# Patient Record
Sex: FEMALE | Race: WHITE | NOT HISPANIC OR LATINO | Employment: FULL TIME | URBAN - METROPOLITAN AREA
[De-identification: names, ages, dates, MRNs, and addresses within clinical notes are randomized per-mention and may not be internally consistent; named-entity substitution may affect disease eponyms.]

---

## 2017-01-04 ENCOUNTER — TRANSCRIBE ORDERS (OUTPATIENT)
Dept: ADMINISTRATIVE | Facility: HOSPITAL | Age: 52
End: 2017-01-04

## 2017-01-04 DIAGNOSIS — Z12.31 VISIT FOR SCREENING MAMMOGRAM: Primary | ICD-10-CM

## 2017-01-11 ENCOUNTER — HOSPITAL ENCOUNTER (OUTPATIENT)
Dept: MAMMOGRAPHY | Facility: HOSPITAL | Age: 52
Discharge: HOME | End: 2017-01-11
Attending: OBSTETRICS & GYNECOLOGY | Admitting: OBSTETRICS & GYNECOLOGY

## 2017-01-11 DIAGNOSIS — Z12.31 VISIT FOR SCREENING MAMMOGRAM: ICD-10-CM

## 2017-01-11 PROCEDURE — G0202 SCR MAMMO BI INCL CAD: HCPCS

## 2018-01-16 ENCOUNTER — TRANSCRIBE ORDERS (OUTPATIENT)
Dept: ADMINISTRATIVE | Facility: HOSPITAL | Age: 53
End: 2018-01-16

## 2018-01-16 DIAGNOSIS — Z12.31 SCREENING MAMMOGRAM, ENCOUNTER FOR: Primary | ICD-10-CM

## 2018-02-13 ENCOUNTER — APPOINTMENT (OUTPATIENT)
Dept: MAMMOGRAPHY | Facility: HOSPITAL | Age: 53
End: 2018-02-13
Attending: OBSTETRICS & GYNECOLOGY

## 2018-02-27 ENCOUNTER — APPOINTMENT (OUTPATIENT)
Dept: MAMMOGRAPHY | Facility: HOSPITAL | Age: 53
End: 2018-02-27
Attending: OBSTETRICS & GYNECOLOGY

## 2018-03-20 ENCOUNTER — HOSPITAL ENCOUNTER (OUTPATIENT)
Dept: MAMMOGRAPHY | Facility: HOSPITAL | Age: 53
Discharge: HOME | End: 2018-03-20
Attending: OBSTETRICS & GYNECOLOGY | Admitting: OBSTETRICS & GYNECOLOGY

## 2018-03-20 DIAGNOSIS — Z12.31 SCREENING MAMMOGRAM, ENCOUNTER FOR: ICD-10-CM

## 2018-03-20 PROCEDURE — 77067 SCR MAMMO BI INCL CAD: CPT

## 2018-03-20 PROCEDURE — 77063 BREAST TOMOSYNTHESIS BI: CPT

## 2018-07-09 ENCOUNTER — TELEPHONE (OUTPATIENT)
Dept: OBSTETRICS AND GYNECOLOGY | Age: 53
End: 2018-07-09

## 2018-07-09 RX ORDER — ACYCLOVIR 400 MG/1
400 TABLET ORAL 3 TIMES DAILY
Qty: 30 TABLET | Refills: 0 | Status: SHIPPED | OUTPATIENT
Start: 2018-07-09 | End: 2020-01-29

## 2019-02-08 ENCOUNTER — TRANSCRIBE ORDERS (OUTPATIENT)
Dept: ADMINISTRATIVE | Facility: HOSPITAL | Age: 54
End: 2019-02-08

## 2019-02-08 DIAGNOSIS — Z13.21 SCREENING FOR MALNUTRITION: Primary | ICD-10-CM

## 2019-03-21 ENCOUNTER — HOSPITAL ENCOUNTER (OUTPATIENT)
Dept: MAMMOGRAPHY | Facility: HOSPITAL | Age: 54
Discharge: HOME | End: 2019-03-21
Admitting: OBSTETRICS & GYNECOLOGY

## 2019-03-21 DIAGNOSIS — Z13.21 SCREENING FOR MALNUTRITION: ICD-10-CM

## 2019-03-21 PROCEDURE — 77067 SCR MAMMO BI INCL CAD: CPT

## 2019-03-21 PROCEDURE — 77063 BREAST TOMOSYNTHESIS BI: CPT

## 2019-03-25 ENCOUNTER — OFFICE VISIT (OUTPATIENT)
Dept: OBSTETRICS AND GYNECOLOGY | Age: 54
End: 2019-03-25

## 2019-03-25 VITALS
DIASTOLIC BLOOD PRESSURE: 84 MMHG | WEIGHT: 219 LBS | SYSTOLIC BLOOD PRESSURE: 124 MMHG | BODY MASS INDEX: 34.37 KG/M2 | HEIGHT: 67 IN

## 2019-03-25 DIAGNOSIS — Z00.00 ANNUAL PHYSICAL EXAM: ICD-10-CM

## 2019-03-25 DIAGNOSIS — Z80.3 FAMILY HISTORY OF SECONDARY BREAST CANCER: ICD-10-CM

## 2019-03-25 DIAGNOSIS — N39.3 SUI (STRESS URINARY INCONTINENCE, FEMALE): ICD-10-CM

## 2019-03-25 DIAGNOSIS — Z12.4 PAP SMEAR FOR CERVICAL CANCER SCREENING: Primary | ICD-10-CM

## 2019-03-25 DIAGNOSIS — Z01.419 WELL WOMAN EXAM WITH ROUTINE GYNECOLOGICAL EXAM: ICD-10-CM

## 2019-03-25 DIAGNOSIS — Z11.51 SCREENING FOR HPV (HUMAN PAPILLOMAVIRUS): ICD-10-CM

## 2019-03-25 PROCEDURE — 99396 PREV VISIT EST AGE 40-64: CPT | Performed by: OBSTETRICS & GYNECOLOGY

## 2019-03-25 NOTE — PROGRESS NOTES
Subjective   Karen Smith is a 53 y.o. female is being seen today for   Chief Complaint   Patient presents with   • Gynecologic Exam     AE today.  Mg 2019.    neg pap, neg HPV.  C-scope 2015.   PM no HRT.    .    History of Present Illness  Patient is here for an annual check after 2+ years.  She is doing very well overall still working at [employer] been there for 29 years and doing well with that.  They just took on 2 new puppies white labs are also doing well.  Nothing new to report in the family went over the cancer history there is a cousin breast on the 50 and a cousin ovarian under 50 and the father had an unknown type ended up in the liver.  Talked about genetic testing gave her the brochure just more FYI than anything.  Bowels work well she does have some stress incontinence but not enough to do anything about at this time.  No other complaints  The following portions of the patient's history were reviewed and updated as appropriate: allergies, current medications, past family history, past medical history, past social history, past surgical history and problem list.    Vitals:    19 0932   BP: 124/84       PAST MEDICAL HISTORY  History reviewed. No pertinent past medical history.  OB History      Para Term  AB Living    0 0 0 0 0 0    SAB TAB Ectopic Molar Multiple Live Births    0 0 0 0 0 0        Obstetric Comments    ONE STEPSON         Past Surgical History:   Procedure Laterality Date   • COLONOSCOPY       History reviewed. No pertinent family history.  Social History     Tobacco Use   Smoking Status Never Smoker   Smokeless Tobacco Never Used       Current Outpatient Medications:   •  acyclovir (ZOVIRAX) 400 MG tablet, Take 1 tablet by mouth 3 (Three) Times a Day. Take no more than 5 doses a day., Disp: 30 tablet, Rfl: 0    There is no immunization history on file for this patient.    Review of Systems   Constitutional: Negative for chills, fatigue, fever and unexpected  weight change.   Respiratory: Negative for shortness of breath and wheezing.    Cardiovascular: Negative for chest pain.   Gastrointestinal: Negative for abdominal distention, abdominal pain, blood in stool, constipation, diarrhea and nausea.   Genitourinary: Negative for difficulty urinating, dyspareunia, dysuria, frequency, hematuria, menstrual problem, pelvic pain, urgency and vaginal discharge.   Skin: Negative for rash.       Objective   Physical Exam   Constitutional: She is oriented to person, place, and time. Vital signs are normal. She appears well-developed and well-nourished.   Neck: No thyromegaly present.   Cardiovascular: Normal rate, regular rhythm and normal heart sounds.   Pulmonary/Chest: Effort normal. Right breast exhibits no inverted nipple, no mass, no nipple discharge, no skin change and no tenderness. Left breast exhibits no inverted nipple, no mass, no nipple discharge, no skin change and no tenderness. Breasts are symmetrical. There is no breast swelling.   Abdominal: Soft.   Genitourinary: Vagina normal and uterus normal. No breast tenderness, discharge or bleeding. Pelvic exam was performed with patient supine. No labial fusion. There is no rash, tenderness, lesion or injury on the right labia. There is no rash, tenderness, lesion or injury on the left labia. Cervix exhibits no motion tenderness, no discharge and no friability. Right adnexum displays no mass, no tenderness and no fullness. Left adnexum displays no mass, no tenderness and no fullness.   Neurological: She is alert and oriented to person, place, and time.   Psychiatric: She has a normal mood and affect.   Vitals reviewed.        Assessment/Plan   Karen was seen today for gynecologic exam.    Diagnoses and all orders for this visit:    Pap smear for cervical cancer screening  -     PapIG, HPV, Rfx 16 / 18    Well woman exam with routine gynecological exam  -     PapIG, HPV, Rfx 16 / 18    Screening for HPV (human  papillomavirus)  -     PapIG, HPV, Rfx 16 / 18    Annual physical exam    CARISA (stress urinary incontinence, female)    Family history of secondary breast cancer      Normal exam today.  Pap smear done today.  Mammogram month ago was normal.  Colonoscopy was in 15 she has 7 years and we will do bone density her first one next year.  Talked about exercise back in a year

## 2019-03-27 LAB
CYTOLOGIST CVX/VAG CYTO: NORMAL
CYTOLOGY CVX/VAG DOC THIN PREP: NORMAL
DX ICD CODE: NORMAL
HIV 1 & 2 AB SER-IMP: NORMAL
HPV I/H RISK 1 DNA CVX QL PROBE+SIG AMP: NEGATIVE
OTHER STN SPEC: NORMAL
PATH REPORT.FINAL DX SPEC: NORMAL
STAT OF ADQ CVX/VAG CYTO-IMP: NORMAL

## 2019-04-29 ENCOUNTER — OFFICE VISIT (OUTPATIENT)
Dept: INTERNAL MEDICINE | Facility: CLINIC | Age: 54
End: 2019-04-29

## 2019-04-29 VITALS — HEIGHT: 67 IN | BODY MASS INDEX: 34.84 KG/M2 | WEIGHT: 222 LBS

## 2019-04-29 DIAGNOSIS — R73.9 HYPERGLYCEMIA: Primary | ICD-10-CM

## 2019-04-29 DIAGNOSIS — E78.49 OTHER HYPERLIPIDEMIA: ICD-10-CM

## 2019-04-29 DIAGNOSIS — Z28.39 HEPATITIS A VACCINATION NOT UP TO DATE: ICD-10-CM

## 2019-04-29 PROCEDURE — 99213 OFFICE O/P EST LOW 20 MIN: CPT | Performed by: INTERNAL MEDICINE

## 2019-04-29 NOTE — PROGRESS NOTES
Assessment and Plan  Problems Addressed this Visit     None      Visit Diagnoses     Hyperglycemia    -  Primary    check labs today    Relevant Orders    Hemoglobin A1c    Other hyperlipidemia        Relevant Orders    Comprehensive Metabolic Panel    Lipid Panel With LDL / HDL Ratio        F/U and Patient Instructions    No Follow-up on file.  There are no Patient Instructions on file for this visit.    Subjective    Karen Smith is a 53 y.o. female being seen in our office today for Weight Loss (6 month check up )     History of the Present Illness  HPI Here to catch -up with me about her weight, elevated BS and liver functin tests.  She has been watching her carb intake and trying to walk more for exercise but has been unsuccessful with weight loss,  She does feel good, however.      Patient History        Significant Past History  The following portions of the patient's history were reviewed and updated as appropriate:PMHroutine: Social history , Past Medical History, Allergies, Current Medications, Active Problem List and Health Maintenance              Social History  She  reports that she has never smoked. She has never used smokeless tobacco. She reports that she drinks alcohol.                         Review of Symptoms  Review of Systems   Endocrine: Negative.    Gastrointestinal: Negative.      Objective  Vital Signs         BP Readings from Last 1 Encounters:   03/25/19 124/84     Wt Readings from Last 3 Encounters:   04/29/19 101 kg (222 lb)   03/25/19 99.3 kg (219 lb)   11/01/16 98 kg (216 lb)   Body mass index is 34.51 kg/m².          Physical Exam   Physical Exam   Cardiovascular: Normal rate.   Pulmonary/Chest: Effort normal.   Abdominal: Soft. There is no hepatomegaly.     Data Reviewed    Recent Results (from the past 2016 hour(s))   PapIG, HPV, Rfx 16 / 18    Collection Time: 03/25/19 10:30 AM   Result Value Ref Range    Diagnosis Comment     Specimen adequacy: Comment     Clinician Provided  ICD-10: Comment     Performed by: Comment     . .     Note: Comment     Method: Comment     HPV, high-risk Negative Negative

## 2019-04-30 ENCOUNTER — TELEPHONE (OUTPATIENT)
Dept: INTERNAL MEDICINE | Facility: CLINIC | Age: 54
End: 2019-04-30

## 2019-04-30 DIAGNOSIS — Z00.00 ANNUAL PHYSICAL EXAM: Primary | ICD-10-CM

## 2019-04-30 LAB
ALBUMIN SERPL-MCNC: 4.5 G/DL (ref 3.5–5.2)
ALBUMIN/GLOB SERPL: 1.5 G/DL
ALP SERPL-CCNC: 52 U/L (ref 39–117)
ALT SERPL-CCNC: 28 U/L (ref 1–33)
AST SERPL-CCNC: 21 U/L (ref 1–32)
BILIRUB SERPL-MCNC: 0.4 MG/DL (ref 0.2–1.2)
BUN SERPL-MCNC: 17 MG/DL (ref 6–20)
BUN/CREAT SERPL: 17.7 (ref 7–25)
CALCIUM SERPL-MCNC: 10.1 MG/DL (ref 8.6–10.5)
CHLORIDE SERPL-SCNC: 101 MMOL/L (ref 98–107)
CO2 SERPL-SCNC: 26.4 MMOL/L (ref 22–29)
CREAT SERPL-MCNC: 0.96 MG/DL (ref 0.57–1)
GLOBULIN SER CALC-MCNC: 3 GM/DL
GLUCOSE SERPL-MCNC: 100 MG/DL (ref 65–99)
HBA1C MFR BLD: 5.4 % (ref 4.8–5.6)
POTASSIUM SERPL-SCNC: 4.2 MMOL/L (ref 3.5–5.2)
PROT SERPL-MCNC: 7.5 G/DL (ref 6–8.5)
SODIUM SERPL-SCNC: 140 MMOL/L (ref 136–145)

## 2019-10-30 ENCOUNTER — RESULTS ENCOUNTER (OUTPATIENT)
Dept: INTERNAL MEDICINE | Facility: CLINIC | Age: 54
End: 2019-10-30

## 2019-10-30 DIAGNOSIS — Z00.00 ANNUAL PHYSICAL EXAM: ICD-10-CM

## 2020-01-29 RX ORDER — ACYCLOVIR 400 MG/1
400 TABLET ORAL 3 TIMES DAILY
Qty: 30 TABLET | Refills: 0 | Status: SHIPPED | OUTPATIENT
Start: 2020-01-29 | End: 2021-02-08 | Stop reason: SDUPTHER

## 2021-02-02 ENCOUNTER — OFFICE VISIT (OUTPATIENT)
Dept: OBSTETRICS AND GYNECOLOGY | Age: 56
End: 2021-02-02

## 2021-02-02 VITALS
DIASTOLIC BLOOD PRESSURE: 62 MMHG | WEIGHT: 224.6 LBS | SYSTOLIC BLOOD PRESSURE: 110 MMHG | HEIGHT: 67 IN | BODY MASS INDEX: 35.25 KG/M2

## 2021-02-02 DIAGNOSIS — Z12.31 ENCOUNTER FOR SCREENING MAMMOGRAM FOR MALIGNANT NEOPLASM OF BREAST: ICD-10-CM

## 2021-02-02 DIAGNOSIS — Z01.419 ENCOUNTER FOR GYNECOLOGICAL EXAMINATION: Primary | ICD-10-CM

## 2021-02-02 DIAGNOSIS — Z12.11 SCREENING FOR COLON CANCER: ICD-10-CM

## 2021-02-02 DIAGNOSIS — Z11.51 ENCOUNTER FOR SCREENING FOR HUMAN PAPILLOMAVIRUS (HPV): ICD-10-CM

## 2021-02-02 PROBLEM — A60.00 GENITAL HERPES: Status: ACTIVE | Noted: 2021-02-02

## 2021-02-02 PROCEDURE — 99396 PREV VISIT EST AGE 40-64: CPT | Performed by: OBSTETRICS & GYNECOLOGY

## 2021-02-02 NOTE — PROGRESS NOTES
".  Subjective     Chief Complaint   Patient presents with   • Gynecologic Exam     NP  AE   LAST PAP 3/25/19-, HPV- (PERNELL) MG 3/21/19       History of Present Illness    Karen Smith is a 55 y.o.  who presents for annual exam.  She was a pt of Dr. 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 Her spouse is retired. She is working remotely.     Obstetric History:  OB History        0    Para   0    Term   0       0    AB   0    Living   0       SAB   0    TAB   0    Ectopic   0    Molar   0    Multiple   0    Live Births   0          Obstetric Comments   ONE STEPSON             Menstrual History:     No LMP recorded (lmp unknown). Patient is postmenopausal.         Current contraception: post menopausal status  History of abnormal Pap smear: no  Received Gardasil immunization: no  Perform regular self breast exam: yes - reg  Family history of uterine or ovarian cancer: no  Family History of colon cancer: yes - father  Family history of breast cancer: yes - cousins    Mammogram: ordered.  Colonoscopy: recommended, done in   DEXA: not indicated.    Exercise: moderately active  Calcium/Vitamin D: adequate intake    The following portions of the patient's history were reviewed and updated as appropriate: allergies, current medications, past family history, past medical history, past social history, past surgical history and problem list.    Review of Systems    Review of Systems   Constitutional: Negative for fatigue.   Respiratory: Negative for shortness of breath.    Gastrointestinal: Negative for abdominal pain. Negative for change in bowel habits  Genitourinary: Negative for dysuria. Negative for vaginal bleeding, abnormal discharge or pelvic pain  Neurological: Negative for headaches.   Psychiatric/Behavioral: Negative for dysphoric mood.         Objective   Physical Exam    /62   Ht 170.2 cm (67\")   Wt 102 kg (224 " lb 9.6 oz)   LMP  (LMP Unknown)   Breastfeeding No   BMI 35.18 kg/m²   General:   alert and no distress   Heart: regular rate and rhythm   Lungs: clear to auscultation bilaterally   Breast: Inspection negative; no masses, retractions, nipple discharge or axillary adenopathy   Neck: Supple, no thyromegaly   Abdomen: soft, non-tender. No masses,  no organomegaly   Pelvis: External genitalia: normal general appearance  Urinary system: urethral meatus normal  Vaginal: normal mucosa without prolapse or lesions  Cervix: normal appearance  Adnexa: no masses or tenderness  Uterus: normal, nontender   Extremities: Extremities normal, atraumatic, no cyanosis or edema   Neurologic: Alert and oriented   Psychiatric: Normal affect, judgement, and mood     Assessment/Plan   Diagnoses and all orders for this visit:    1. Encounter for gynecological examination (Primary)  -     IGP, Apt HPV,rfx 16 / 18,45    2. Encounter for screening for human papillomavirus (HPV)  -     IGP, Apt HPV,rfx 16 / 18,45    3. Encounter for screening mammogram for malignant neoplasm of breast  -     Mammo Screening Digital Tomosynthesis Bilateral With CAD; Future    4. Screening for colon cancer  -     Ambulatory Referral to General Surgery        All questions answered.  Breast self exam technique reviewed and patient encouraged to perform self-exam monthly.  Discussed healthy lifestyle modifications.  Recommended 30 minutes of aerobic exercise five times per week.  Discussed calcium/ Vit D needs to prevent osteoporosis.  Advised pt to call if she does not receive results of pap within 2 weeks  Encouraged her to book mammogram and order placed; advised she call if she does not receive results of imaging within 2 weeks of appt  Recommend colonoscopy due to family hx and placed order; advised her to call if she does note receive contact from surgeon's office to book within 2 weeks    Recommended genetic testing due to family history of cancer. Pt  declines. She received information in past from Dr. Vera.   Recommend f/u 1 year or prn

## 2021-02-03 DIAGNOSIS — E78.49 OTHER HYPERLIPIDEMIA: ICD-10-CM

## 2021-02-03 DIAGNOSIS — Z00.00 ANNUAL PHYSICAL EXAM: Primary | ICD-10-CM

## 2021-02-03 DIAGNOSIS — R73.9 HYPERGLYCEMIA: ICD-10-CM

## 2021-02-04 LAB
ALBUMIN SERPL-MCNC: 4.5 G/DL (ref 3.5–5.2)
ALBUMIN/GLOB SERPL: 1.7 G/DL
ALP SERPL-CCNC: 53 U/L (ref 39–117)
ALT SERPL-CCNC: 49 U/L (ref 1–33)
AST SERPL-CCNC: 46 U/L (ref 1–32)
BASOPHILS # BLD AUTO: 0.04 10*3/MM3 (ref 0–0.2)
BASOPHILS NFR BLD AUTO: 0.7 % (ref 0–1.5)
BILIRUB SERPL-MCNC: 0.6 MG/DL (ref 0–1.2)
BUN SERPL-MCNC: 15 MG/DL (ref 6–20)
BUN/CREAT SERPL: 16.5 (ref 7–25)
CALCIUM SERPL-MCNC: 9.9 MG/DL (ref 8.6–10.5)
CHLORIDE SERPL-SCNC: 103 MMOL/L (ref 98–107)
CHOLEST SERPL-MCNC: 259 MG/DL (ref 0–200)
CO2 SERPL-SCNC: 28.9 MMOL/L (ref 22–29)
CREAT SERPL-MCNC: 0.91 MG/DL (ref 0.57–1)
CYTOLOGIST CVX/VAG CYTO: NORMAL
CYTOLOGY CVX/VAG DOC CYTO: NORMAL
CYTOLOGY CVX/VAG DOC THIN PREP: NORMAL
DX ICD CODE: NORMAL
EOSINOPHIL # BLD AUTO: 0.15 10*3/MM3 (ref 0–0.4)
EOSINOPHIL NFR BLD AUTO: 2.5 % (ref 0.3–6.2)
ERYTHROCYTE [DISTWIDTH] IN BLOOD BY AUTOMATED COUNT: 11.7 % (ref 12.3–15.4)
GLOBULIN SER CALC-MCNC: 2.7 GM/DL
GLUCOSE SERPL-MCNC: 119 MG/DL (ref 65–99)
HBA1C MFR BLD: 5.3 % (ref 4.8–5.6)
HCT VFR BLD AUTO: 45.5 % (ref 34–46.6)
HDLC SERPL-MCNC: 62 MG/DL (ref 40–60)
HGB BLD-MCNC: 15.6 G/DL (ref 12–15.9)
HIV 1 & 2 AB SER-IMP: NORMAL
HPV I/H RISK 4 DNA CVX QL PROBE+SIG AMP: NEGATIVE
IMM GRANULOCYTES # BLD AUTO: 0.02 10*3/MM3 (ref 0–0.05)
IMM GRANULOCYTES NFR BLD AUTO: 0.3 % (ref 0–0.5)
LDLC SERPL CALC-MCNC: 177 MG/DL (ref 0–100)
LYMPHOCYTES # BLD AUTO: 1.84 10*3/MM3 (ref 0.7–3.1)
LYMPHOCYTES NFR BLD AUTO: 30.9 % (ref 19.6–45.3)
MCH RBC QN AUTO: 33 PG (ref 26.6–33)
MCHC RBC AUTO-ENTMCNC: 34.3 G/DL (ref 31.5–35.7)
MCV RBC AUTO: 96.2 FL (ref 79–97)
MONOCYTES # BLD AUTO: 0.48 10*3/MM3 (ref 0.1–0.9)
MONOCYTES NFR BLD AUTO: 8.1 % (ref 5–12)
NEUTROPHILS # BLD AUTO: 3.43 10*3/MM3 (ref 1.7–7)
NEUTROPHILS NFR BLD AUTO: 57.5 % (ref 42.7–76)
NRBC BLD AUTO-RTO: 0 /100 WBC (ref 0–0.2)
OTHER STN SPEC: NORMAL
PLATELET # BLD AUTO: 213 10*3/MM3 (ref 140–450)
POTASSIUM SERPL-SCNC: 4.6 MMOL/L (ref 3.5–5.2)
PROT SERPL-MCNC: 7.2 G/DL (ref 6–8.5)
RBC # BLD AUTO: 4.73 10*6/MM3 (ref 3.77–5.28)
SODIUM SERPL-SCNC: 140 MMOL/L (ref 136–145)
STAT OF ADQ CVX/VAG CYTO-IMP: NORMAL
TRIGL SERPL-MCNC: 113 MG/DL (ref 0–150)
VLDLC SERPL CALC-MCNC: 20 MG/DL (ref 5–40)
WBC # BLD AUTO: 5.96 10*3/MM3 (ref 3.4–10.8)

## 2021-02-05 ENCOUNTER — TELEPHONE (OUTPATIENT)
Dept: OBSTETRICS AND GYNECOLOGY | Age: 56
End: 2021-02-05

## 2021-02-05 NOTE — TELEPHONE ENCOUNTER
----- Message from Lauren Garcia MD sent at 2/5/2021  8:39 AM EST -----  Please call pt, her pap and hpv are negative/normal

## 2021-02-08 ENCOUNTER — OFFICE VISIT (OUTPATIENT)
Dept: INTERNAL MEDICINE | Facility: CLINIC | Age: 56
End: 2021-02-08

## 2021-02-08 VITALS
DIASTOLIC BLOOD PRESSURE: 80 MMHG | BODY MASS INDEX: 35.79 KG/M2 | WEIGHT: 228 LBS | TEMPERATURE: 96.9 F | HEART RATE: 84 BPM | SYSTOLIC BLOOD PRESSURE: 124 MMHG | HEIGHT: 67 IN

## 2021-02-08 DIAGNOSIS — Z23 NEED FOR HEPATITIS A VACCINATION: ICD-10-CM

## 2021-02-08 DIAGNOSIS — R79.89 ELEVATED LFTS: Primary | ICD-10-CM

## 2021-02-08 DIAGNOSIS — Z00.00 MEDICARE ANNUAL WELLNESS VISIT, SUBSEQUENT: ICD-10-CM

## 2021-02-08 PROCEDURE — 90632 HEPA VACCINE ADULT IM: CPT | Performed by: INTERNAL MEDICINE

## 2021-02-08 PROCEDURE — 90471 IMMUNIZATION ADMIN: CPT | Performed by: INTERNAL MEDICINE

## 2021-02-08 PROCEDURE — 99396 PREV VISIT EST AGE 40-64: CPT | Performed by: INTERNAL MEDICINE

## 2021-02-08 RX ORDER — ACYCLOVIR 400 MG/1
400 TABLET ORAL 3 TIMES DAILY
Qty: 30 TABLET | Refills: 0 | Status: SHIPPED | OUTPATIENT
Start: 2021-02-08 | End: 2022-09-26

## 2021-02-08 NOTE — PROGRESS NOTES
"Subjective   Karen Smith is a 55 y.o. female and is here for a comprehensive physical exam. The patient reports no problems.  She has been working from home so not as much physical activity. Weight is up about 15 lbs in 18 months.      Pt is UTD with annual gyn exam and mammo- saw Dr. Garcia last week.  MMG scheduled for May.    Has packet of info for c-scope.           Social History     Socioeconomic History   • Marital status:      Spouse name: LIAM   • Number of children: 1   • Years of education: Not on file   • Highest education level: Not on file   Tobacco Use   • Smoking status: Never Smoker   • Smokeless tobacco: Never Used   Substance and Sexual Activity   • Alcohol use: Yes     Comment: SC       Family History   Problem Relation Age of Onset   • Heart disease Mother    • Alcohol abuse Father    • Colon cancer Father    • Prostate cancer Father    • Alcohol abuse Brother    • Arthritis Maternal Grandmother    • Arthritis Paternal Grandfather    • Breast cancer Other    • Breast cancer Paternal Cousin    • Uterine cancer Neg Hx           Past Medical History:   Diagnosis Date   • Allergic rhinitis    • Hyperlipidemia    • Kidney stone    • Renal calculi    • Tortuous colon           Current Outpatient Medications:   •  acyclovir (ZOVIRAX) 400 MG tablet, Take 1 tablet by mouth 3 (Three) Times a Day. Take no more than 5 doses a day., Disp: 30 tablet, Rfl: 0  •  cetirizine (zyrTEC) 10 MG tablet, Take 10 mg by mouth Daily., Disp: , Rfl:   •  FIBER DIET tablet, Take  by mouth., Disp: , Rfl:   No current facility-administered medications for this visit.     Review of Systems    Review of Systems     There were no vitals filed for this visit.    Vitals:    02/08/21 1326   BP: 124/80   Pulse: 84   Temp: 96.9 °F (36.1 °C)   Weight: 103 kg (228 lb)   Height: 170.2 cm (67\")     Body mass index is 35.71 kg/m².  Wt Readings from Last 3 Encounters:   02/08/21 103 kg (228 lb)   02/02/21 102 kg (224 lb 9.6 oz) "   04/29/19 101 kg (222 lb)      Objective     Physical Exam    Procedures       Assessment/Plan         Diagnoses and all orders for this visit:    1. Elevated LFTs (Primary)  Comments:  suspect related to weight gain- discussed need for weight loss, recommend Mediterranean diet.  Increase exercise to 150 min/week, minimum.  Orders:  -     Comprehensive Metabolic Panel; Future    2. Need for hepatitis A vaccination  -     Hepatitis A Vaccine Adult IM    3. Medicare annual wellness visit, subsequent  Comments:  The above reviewed, elevated chol with only 2.7% 1-year CVD risk- reviewed with her- Hep A today.  Recheck the above in 6 months.     Other orders  -     acyclovir (ZOVIRAX) 400 MG tablet; Take 1 tablet by mouth 3 (Three) Times a Day. Take no more than 5 doses a day.  Dispense: 30 tablet; Refill: 0        Return in about 6 months (around 8/8/2021) for Recheck, Lab Before FU.

## 2021-03-10 ENCOUNTER — PREP FOR SURGERY (OUTPATIENT)
Dept: OTHER | Facility: HOSPITAL | Age: 56
End: 2021-03-10

## 2021-03-10 DIAGNOSIS — Z12.11 SCREEN FOR COLON CANCER: Primary | ICD-10-CM

## 2021-04-06 PROBLEM — Z12.11 SCREEN FOR COLON CANCER: Status: ACTIVE | Noted: 2021-04-06

## 2021-05-19 ENCOUNTER — HOSPITAL ENCOUNTER (OUTPATIENT)
Dept: MAMMOGRAPHY | Facility: HOSPITAL | Age: 56
Discharge: HOME | End: 2021-05-19
Admitting: OBSTETRICS & GYNECOLOGY

## 2021-05-19 DIAGNOSIS — Z12.31 ENCOUNTER FOR SCREENING MAMMOGRAM FOR MALIGNANT NEOPLASM OF BREAST: ICD-10-CM

## 2021-05-19 PROCEDURE — 77063 BREAST TOMOSYNTHESIS BI: CPT

## 2021-05-19 PROCEDURE — 77067 SCR MAMMO BI INCL CAD: CPT

## 2021-05-27 ENCOUNTER — ANESTHESIA (OUTPATIENT)
Dept: GASTROENTEROLOGY | Facility: HOSPITAL | Age: 56
End: 2021-05-27

## 2021-05-27 ENCOUNTER — ANESTHESIA EVENT (OUTPATIENT)
Dept: GASTROENTEROLOGY | Facility: HOSPITAL | Age: 56
End: 2021-05-27

## 2021-05-27 ENCOUNTER — HOSPITAL ENCOUNTER (OUTPATIENT)
Facility: HOSPITAL | Age: 56
Setting detail: HOSPITAL OUTPATIENT SURGERY
Discharge: HOME | End: 2021-05-27
Attending: SURGERY | Admitting: SURGERY

## 2021-05-27 VITALS
HEART RATE: 61 BPM | HEIGHT: 67 IN | BODY MASS INDEX: 34.69 KG/M2 | DIASTOLIC BLOOD PRESSURE: 85 MMHG | WEIGHT: 221 LBS | RESPIRATION RATE: 16 BRPM | SYSTOLIC BLOOD PRESSURE: 137 MMHG | OXYGEN SATURATION: 99 %

## 2021-05-27 DIAGNOSIS — Z12.11 SCREEN FOR COLON CANCER: ICD-10-CM

## 2021-05-27 PROCEDURE — 25010000002 PROPOFOL 10 MG/ML EMULSION: Performed by: ANESTHESIOLOGY

## 2021-05-27 PROCEDURE — 88305 TISSUE EXAM BY PATHOLOGIST: CPT | Performed by: SURGERY

## 2021-05-27 PROCEDURE — 25010000002 ONDANSETRON PER 1 MG: Performed by: ANESTHESIOLOGY

## 2021-05-27 PROCEDURE — S0260 H&P FOR SURGERY: HCPCS | Performed by: SURGERY

## 2021-05-27 PROCEDURE — 45380 COLONOSCOPY AND BIOPSY: CPT | Performed by: SURGERY

## 2021-05-27 RX ORDER — ONDANSETRON 2 MG/ML
4 INJECTION INTRAMUSCULAR; INTRAVENOUS ONCE AS NEEDED
Status: COMPLETED | OUTPATIENT
Start: 2021-05-27 | End: 2021-05-27

## 2021-05-27 RX ORDER — SODIUM CHLORIDE, SODIUM LACTATE, POTASSIUM CHLORIDE, CALCIUM CHLORIDE 600; 310; 30; 20 MG/100ML; MG/100ML; MG/100ML; MG/100ML
30 INJECTION, SOLUTION INTRAVENOUS CONTINUOUS PRN
Status: DISCONTINUED | OUTPATIENT
Start: 2021-05-27 | End: 2021-05-27 | Stop reason: HOSPADM

## 2021-05-27 RX ORDER — LIDOCAINE HYDROCHLORIDE 20 MG/ML
INJECTION, SOLUTION INFILTRATION; PERINEURAL AS NEEDED
Status: DISCONTINUED | OUTPATIENT
Start: 2021-05-27 | End: 2021-05-27 | Stop reason: SURG

## 2021-05-27 RX ORDER — PROPOFOL 10 MG/ML
VIAL (ML) INTRAVENOUS AS NEEDED
Status: DISCONTINUED | OUTPATIENT
Start: 2021-05-27 | End: 2021-05-27 | Stop reason: SURG

## 2021-05-27 RX ORDER — GLYCOPYRROLATE 0.2 MG/ML
INJECTION INTRAMUSCULAR; INTRAVENOUS AS NEEDED
Status: DISCONTINUED | OUTPATIENT
Start: 2021-05-27 | End: 2021-05-27 | Stop reason: SURG

## 2021-05-27 RX ORDER — PROPOFOL 10 MG/ML
VIAL (ML) INTRAVENOUS CONTINUOUS PRN
Status: DISCONTINUED | OUTPATIENT
Start: 2021-05-27 | End: 2021-05-27 | Stop reason: SURG

## 2021-05-27 RX ADMIN — PROPOFOL 125 MCG/KG/MIN: 10 INJECTION, EMULSION INTRAVENOUS at 08:37

## 2021-05-27 RX ADMIN — GLYCOPYRROLATE 0.2 MG: 0.2 INJECTION INTRAMUSCULAR; INTRAVENOUS at 09:08

## 2021-05-27 RX ADMIN — PROPOFOL 50 MG: 10 INJECTION, EMULSION INTRAVENOUS at 08:45

## 2021-05-27 RX ADMIN — ONDANSETRON 4 MG: 2 INJECTION INTRAMUSCULAR; INTRAVENOUS at 10:28

## 2021-05-27 RX ADMIN — PROPOFOL 50 MG: 10 INJECTION, EMULSION INTRAVENOUS at 08:52

## 2021-05-27 RX ADMIN — SODIUM CHLORIDE, POTASSIUM CHLORIDE, SODIUM LACTATE AND CALCIUM CHLORIDE 30 ML/HR: 600; 310; 30; 20 INJECTION, SOLUTION INTRAVENOUS at 08:20

## 2021-05-27 RX ADMIN — PROPOFOL 75 MG: 10 INJECTION, EMULSION INTRAVENOUS at 08:36

## 2021-05-27 RX ADMIN — LIDOCAINE HYDROCHLORIDE 50 MG: 20 INJECTION, SOLUTION INFILTRATION; PERINEURAL at 08:34

## 2021-05-27 NOTE — ANESTHESIA POSTPROCEDURE EVALUATION
Patient: Karen Smith    Procedure Summary     Date: 05/27/21 Room / [facility]:  [facility]    Anesthesia Start: 0833 Anesthesia Stop: 0958    Procedure: COLONOSCOPY to cecum:  cold biopsy polypectomy, (N/A ) Diagnosis:       Screen for colon cancer      (Screen for colon cancer [Z12.11])    Surgeons: Logan Contreras MD Provider: Kelby Ta MD    Anesthesia Type: MAC ASA Status: 2          Anesthesia Type: MAC    Vitals  Vitals Value Taken Time   /96 05/27/21 1018   Temp     Pulse 73 05/27/21 1018   Resp 16 05/27/21 1018   SpO2 94 % 05/27/21 1018           Post Anesthesia Care and Evaluation    Patient location during evaluation: PACU  Patient participation: complete - patient participated  Level of consciousness: awake  Pain score: 2  Pain management: adequate  Airway patency: patent  Anesthetic complications: No anesthetic complications  PONV Status: controlled  Cardiovascular status: acceptable  Respiratory status: acceptable  Hydration status: acceptable

## 2021-05-28 LAB
CYTO UR: NORMAL
LAB AP CASE REPORT: NORMAL
PATH REPORT.FINAL DX SPEC: NORMAL
PATH REPORT.GROSS SPEC: NORMAL

## 2021-06-03 ENCOUNTER — DOCUMENTATION (OUTPATIENT)
Dept: SURGERY | Facility: CLINIC | Age: 56
End: 2021-06-03

## 2021-06-03 NOTE — PROGRESS NOTES
Called and left message for patient.  Entirely benign polyp (hyperplastic).  Repeat colonoscopy in 5 years based on family history of colon cancer.

## 2021-09-27 ENCOUNTER — OFFICE VISIT (OUTPATIENT)
Dept: INTERNAL MEDICINE | Facility: CLINIC | Age: 56
End: 2021-09-27

## 2021-09-27 VITALS — BODY MASS INDEX: 35.16 KG/M2 | TEMPERATURE: 97.3 F | WEIGHT: 224 LBS | HEIGHT: 67 IN

## 2021-09-27 DIAGNOSIS — E78.5 BORDERLINE HYPERLIPIDEMIA: Primary | ICD-10-CM

## 2021-09-27 DIAGNOSIS — Z00.00 PHYSICAL EXAM, ANNUAL: ICD-10-CM

## 2021-09-27 DIAGNOSIS — Z23 NEED FOR INFLUENZA VACCINATION: ICD-10-CM

## 2021-09-27 PROCEDURE — 90686 IIV4 VACC NO PRSV 0.5 ML IM: CPT | Performed by: INTERNAL MEDICINE

## 2021-09-27 PROCEDURE — 99213 OFFICE O/P EST LOW 20 MIN: CPT | Performed by: INTERNAL MEDICINE

## 2021-09-27 PROCEDURE — 90471 IMMUNIZATION ADMIN: CPT | Performed by: INTERNAL MEDICINE

## 2021-09-27 NOTE — PROGRESS NOTES
"Chief Complaint  No chief complaint on file.    Subjective          Karen Smith presents to [facility]  History of Present Illness  Here for reg f/u- has been doing about the same.  She has been working from home.  Trying to walk more on her breaks. Trying to follow Mediterranean diet to treat cholesterol.      Objective   Vital Signs:   Temp 97.3 °F (36.3 °C)   Ht 170.2 cm (67\")   Wt 102 kg (224 lb)   BMI 35.08 kg/m²     Physical Exam  Constitutional:       Appearance: Normal appearance.   Cardiovascular:      Rate and Rhythm: Normal rate.        Result Review :                 Assessment and Plan    Diagnoses and all orders for this visit:    1. Borderline hyperlipidemia (Primary)  Comments:  Encouraged her to continue with hte diet plan, recheck labs in 6 months.     2. Physical exam, annual  Comments:  for f/u  Orders:  -     Comprehensive Metabolic Panel; Future  -     Lipid Panel With / Chol / HDL Ratio; Future  -     TSH; Future    3. Need for influenza vaccination  -     FluLaval/Fluarix >6 Months ([id number])        Follow Up   Return in about 6 months (around 3/27/2022) for Annual physical, Lab Before FUP.  Patient was given instructions and counseling regarding her condition or for health maintenance advice. Please see specific information pulled into the AVS if appropriate.       "

## 2022-05-10 ENCOUNTER — OFFICE VISIT (OUTPATIENT)
Dept: INTERNAL MEDICINE | Facility: CLINIC | Age: 57
End: 2022-05-10

## 2022-05-10 VITALS
BODY MASS INDEX: 35.16 KG/M2 | DIASTOLIC BLOOD PRESSURE: 76 MMHG | HEIGHT: 67 IN | HEART RATE: 76 BPM | TEMPERATURE: 96.6 F | WEIGHT: 224 LBS | SYSTOLIC BLOOD PRESSURE: 128 MMHG

## 2022-05-10 DIAGNOSIS — Z12.31 VISIT FOR SCREENING MAMMOGRAM: Primary | ICD-10-CM

## 2022-05-10 DIAGNOSIS — Z00.00 PHYSICAL EXAM, ANNUAL: ICD-10-CM

## 2022-05-10 DIAGNOSIS — R73.9 HYPERGLYCEMIA: ICD-10-CM

## 2022-05-10 DIAGNOSIS — E78.49 OTHER HYPERLIPIDEMIA: ICD-10-CM

## 2022-05-10 PROCEDURE — 99396 PREV VISIT EST AGE 40-64: CPT | Performed by: INTERNAL MEDICINE

## 2022-05-10 NOTE — PROGRESS NOTES
Subjective   Karen Smith is a 56 y.o. female and is here for a comprehensive physical exam. The patient reports no problems.  She is not doing a good job with stress and her dealing with diet/exercise.  Her mother has some heart issues and she is afraid she will get them too, feels motivated to make some changes.     Denies polys    Social History     Socioeconomic History   • Marital status:      Spouse name: LIAM   • Number of children: 1   Tobacco Use   • Smoking status: Never Smoker   • Smokeless tobacco: Never Used   Substance and Sexual Activity   • Alcohol use: Yes     Comment: SC   • Drug use: Never       Family History   Problem Relation Age of Onset   • Heart disease Mother    • Alcohol abuse Father    • Colon cancer Father    • Prostate cancer Father    • Alcohol abuse Brother    • Arthritis Maternal Grandmother    • Arthritis Paternal Grandfather    • Breast cancer Other    • Breast cancer Paternal Cousin    • Uterine cancer Neg Hx           Past Medical History:   Diagnosis Date   • Allergic rhinitis    • Hyperlipidemia    • Kidney stone    • Renal calculi    • Tortuous colon           Current Outpatient Medications:   •  acyclovir (ZOVIRAX) 400 MG tablet, Take 1 tablet by mouth 3 (Three) Times a Day. Take no more than 5 doses a day., Disp: 30 tablet, Rfl: 0  •  cetirizine (zyrTEC) 10 MG tablet, Take 10 mg by mouth Daily., Disp: , Rfl:   •  FIBER DIET tablet, Take  by mouth., Disp: , Rfl:   •  vitamin D3 125 MCG (5000 UT) capsule capsule, Take 5,000 Units by mouth Daily., Disp: , Rfl:   •  vitamin E 100 UNIT capsule, Take 100 Units by mouth Daily., Disp: , Rfl:   •  Zinc Sulfate (ZINC 15 PO), Take  by mouth., Disp: , Rfl:     Review of Systems    Review of Systems   Constitutional: Negative for fatigue and unexpected weight loss.   HENT: Negative for trouble swallowing.    Eyes: Negative for visual disturbance.   Respiratory: Negative for shortness of breath.    Cardiovascular: Negative for  "chest pain and palpitations.   Gastrointestinal: Negative for abdominal pain and blood in stool.   Endocrine: Negative for cold intolerance.   Genitourinary: Negative for decreased libido.   Musculoskeletal: Negative for arthralgias and gait problem.   Neurological: Negative for memory problem.   Psychiatric/Behavioral: Negative for depressed mood.        There were no vitals filed for this visit.    Vitals:    05/10/22 0836   BP: 128/76   Pulse: 76   Temp: 96.6 °F (35.9 °C)   Weight: 102 kg (224 lb)   Height: 170.2 cm (67\")     Body mass index is 35.08 kg/m².  Wt Readings from Last 3 Encounters:   05/10/22 102 kg (224 lb)   09/27/21 102 kg (224 lb)   05/27/21 100 kg (221 lb)      Objective     Physical Exam  Constitutional:       General: She is not in acute distress.  Eyes:      Conjunctiva/sclera: Conjunctivae normal.   Neck:      Thyroid: No thyromegaly.   Cardiovascular:      Rate and Rhythm: Normal rate and regular rhythm.      Heart sounds: Normal heart sounds.   Pulmonary:      Effort: Pulmonary effort is normal.      Breath sounds: Normal breath sounds.   Chest:   Breasts:      Right: Normal.      Left: Normal.       Abdominal:      General: Bowel sounds are normal.      Palpations: Abdomen is soft.   Lymphadenopathy:      Cervical: No cervical adenopathy.   Skin:     General: Skin is warm and dry.   Neurological:      Mental Status: She is alert and oriented to person, place, and time.   Psychiatric:         Behavior: Behavior normal.         Thought Content: Thought content normal.         Judgment: Judgment normal.         Procedures       [unfilled]        Diagnoses and all orders for this visit:    1. Visit for screening mammogram (Primary)  -     Mammo Screening Bilateral With CAD    2. Hyperglycemia  Comments:  increasing over last few years- discussed relationship to weight.  Will recheck in 6 months.   Orders:  -     Hemoglobin A1c; Future    3. Other hyperlipidemia  Comments:  elevated " but wants to work on diet first, will start statin at f/u if not better controlled.   Orders:  -     Comprehensive Metabolic Panel; Future  -     Lipid Panel With / Chol / HDL Ratio; Future    4. Physical exam, annual  Comments:  recommend Covid booster, Shingrix at pharmacy.  She is going to work on her weight and walking regimen- recheck 6 m or as needed.         Return in about 6 months (around 11/10/2022) for Recheck, Lab Before FUP.

## 2022-08-04 ENCOUNTER — HOSPITAL ENCOUNTER (OUTPATIENT)
Dept: MAMMOGRAPHY | Facility: HOSPITAL | Age: 57
Discharge: HOME | End: 2022-08-04
Admitting: INTERNAL MEDICINE

## 2022-08-04 PROCEDURE — 77067 SCR MAMMO BI INCL CAD: CPT

## 2022-08-04 PROCEDURE — 77063 BREAST TOMOSYNTHESIS BI: CPT

## 2022-09-26 RX ORDER — ACYCLOVIR 400 MG/1
400 TABLET ORAL 3 TIMES DAILY
Qty: 30 TABLET | Refills: 0 | Status: SHIPPED | OUTPATIENT
Start: 2022-09-26

## 2023-03-06 ENCOUNTER — LAB (OUTPATIENT)
Dept: LAB | Facility: HOSPITAL | Age: 58
End: 2023-03-06
Payer: COMMERCIAL

## 2023-03-06 PROCEDURE — 80061 LIPID PANEL: CPT | Performed by: INTERNAL MEDICINE

## 2023-03-06 PROCEDURE — 83036 HEMOGLOBIN GLYCOSYLATED A1C: CPT | Performed by: INTERNAL MEDICINE

## 2023-03-06 PROCEDURE — 80053 COMPREHEN METABOLIC PANEL: CPT | Performed by: INTERNAL MEDICINE

## 2023-03-07 ENCOUNTER — OFFICE VISIT (OUTPATIENT)
Dept: INTERNAL MEDICINE | Facility: CLINIC | Age: 58
End: 2023-03-07
Payer: COMMERCIAL

## 2023-03-07 VITALS
HEART RATE: 80 BPM | SYSTOLIC BLOOD PRESSURE: 120 MMHG | DIASTOLIC BLOOD PRESSURE: 76 MMHG | BODY MASS INDEX: 35.63 KG/M2 | WEIGHT: 227 LBS | HEIGHT: 67 IN

## 2023-03-07 DIAGNOSIS — R73.01 ELEVATED FASTING BLOOD SUGAR: Chronic | ICD-10-CM

## 2023-03-07 DIAGNOSIS — E78.5 BORDERLINE HYPERLIPIDEMIA: Primary | Chronic | ICD-10-CM

## 2023-03-07 PROCEDURE — 99214 OFFICE O/P EST MOD 30 MIN: CPT | Performed by: INTERNAL MEDICINE

## 2023-03-07 NOTE — PROGRESS NOTES
"Chief Complaint  Hyperlipidemia    Subjective        Karen Smith presents to [facility]  History of Present Illness here for reg f/u of cholesterol- she is not taking any meds.   Has a cyst on her back - Dr. Arevalo recommended she leave it alone but she is bothered by it.  Hasn't changed.   She doesn't want vaccines- understands risks but doesn't feel like she would benefit.   Weight hasn't changed in many years- she walks for exercise- about 2 miles most days. Working on improving diet.      Objective   Vital Signs:  /76   Pulse 80   Ht 170.2 cm (67\")   Wt 103 kg (227 lb)   BMI 35.55 kg/m²   Estimated body mass index is 35.55 kg/m² as calculated from the following:    Height as of this encounter: 170.2 cm (67\").    Weight as of this encounter: 103 kg (227 lb).         The 10-year ASCVD risk score (Xiao GOMES, et al., 2019) is: 2.9%    Values used to calculate the score:      Age: 57 years      Sex: Female      Is Non- : No      Diabetic: No      Tobacco smoker: No      Systolic Blood Pressure: 120 mmHg      Is BP treated: No      HDL Cholesterol: 57 mg/dL      Total Cholesterol: 258 mg/dL      Physical Exam  Constitutional:       Appearance: Normal appearance.   Cardiovascular:      Rate and Rhythm: Normal rate and regular rhythm.   Musculoskeletal:      Right lower leg: No edema.      Left lower leg: No edema.        Result Review :  The following data was reviewed by: Saray Connolly MD on 03/07/2023:  Common labs    Common Labs 3/24/22 3/24/22 3/6/23 3/6/23 3/6/23    0858 0858 [id number] [id number] 0737   Glucose 126 (A)    125 (A)   BUN 15    17   Creatinine 0.84    0.96   Sodium 143    141   Potassium 4.2    4.9   Chloride 105    103   Calcium 9.2    10.0   Total Protein 7.1       Albumin 4.5    4.7   Total Bilirubin 0.6    0.7   Alkaline Phosphatase 52    58   AST (SGOT) 22    24   ALT (SGPT) 22    27   Total Cholesterol    258 (A)    Total Cholesterol  " 262 (A)      Triglycerides  134  115    HDL Cholesterol  59  57    LDL Cholesterol   179 (A)  181 (A)    Hemoglobin A1C   5.30     (A) Abnormal value                         Assessment and Plan   Diagnoses and all orders for this visit:    1. Borderline hyperlipidemia (Primary)  Comments:  10 year risk remains low at 2.9%- stressed ongoing need for diet/exercise/weight loss.  recheck 6m .  Orders:  -     Comprehensive Metabolic Panel; Future  -     Lipid Panel With / Chol / HDL Ratio; Future    2. Elevated fasting blood sugar  Comments:  A1C remains  low but may change- same recommendations.   Orders:  -     Hemoglobin A1c; Future             Follow Up   Return in about 6 months (around 9/7/2023) for Annual physical, Lab Before FUP.  Patient was given instructions and counseling regarding her condition or for health maintenance advice. Please see specific information pulled into the AVS if appropriate.

## 2023-08-11 ENCOUNTER — HOSPITAL ENCOUNTER (OUTPATIENT)
Dept: MAMMOGRAPHY | Facility: HOSPITAL | Age: 58
Discharge: HOME | End: 2023-08-11
Admitting: INTERNAL MEDICINE
Payer: COMMERCIAL

## 2023-08-11 DIAGNOSIS — Z12.31 VISIT FOR SCREENING MAMMOGRAM: ICD-10-CM

## 2023-08-11 PROCEDURE — 77063 BREAST TOMOSYNTHESIS BI: CPT

## 2023-08-11 PROCEDURE — 77067 SCR MAMMO BI INCL CAD: CPT

## 2024-07-03 ENCOUNTER — OFFICE VISIT (OUTPATIENT)
Dept: INTERNAL MEDICINE | Facility: CLINIC | Age: 59
End: 2024-07-03
Payer: COMMERCIAL

## 2024-07-03 VITALS
DIASTOLIC BLOOD PRESSURE: 74 MMHG | BODY MASS INDEX: 35.47 KG/M2 | SYSTOLIC BLOOD PRESSURE: 130 MMHG | HEART RATE: 78 BPM | WEIGHT: 226 LBS | HEIGHT: 67 IN

## 2024-07-03 DIAGNOSIS — Z12.31 VISIT FOR SCREENING MAMMOGRAM: Primary | ICD-10-CM

## 2024-07-03 DIAGNOSIS — Z00.00 PHYSICAL EXAM, ANNUAL: ICD-10-CM

## 2024-07-03 DIAGNOSIS — R73.01 FASTING HYPERGLYCEMIA: ICD-10-CM

## 2024-07-03 DIAGNOSIS — Z01.419 ENCOUNTER FOR GYNECOLOGICAL EXAMINATION: ICD-10-CM

## 2024-07-03 LAB
ALBUMIN SERPL-MCNC: 4.5 G/DL (ref 3.5–5.2)
ALBUMIN/GLOB SERPL: 1.9 G/DL
ALP SERPL-CCNC: 53 U/L (ref 39–117)
ALT SERPL-CCNC: 36 U/L (ref 1–33)
AST SERPL-CCNC: 33 U/L (ref 1–32)
BILIRUB SERPL-MCNC: 0.6 MG/DL (ref 0–1.2)
BUN SERPL-MCNC: 14 MG/DL (ref 6–20)
BUN/CREAT SERPL: 15.9 (ref 7–25)
CALCIUM SERPL-MCNC: 9.7 MG/DL (ref 8.6–10.5)
CHLORIDE SERPL-SCNC: 103 MMOL/L (ref 98–107)
CHOLEST SERPL-MCNC: 255 MG/DL (ref 0–200)
CHOLEST/HDLC SERPL: 4.47 {RATIO}
CO2 SERPL-SCNC: 27.2 MMOL/L (ref 22–29)
CREAT SERPL-MCNC: 0.88 MG/DL (ref 0.57–1)
EGFRCR SERPLBLD CKD-EPI 2021: 75.8 ML/MIN/1.73
GLOBULIN SER CALC-MCNC: 2.4 GM/DL
GLUCOSE SERPL-MCNC: 111 MG/DL (ref 65–99)
HBA1C MFR BLD: 5.5 % (ref 4.8–5.6)
HDLC SERPL-MCNC: 57 MG/DL (ref 40–60)
LDLC SERPL CALC-MCNC: 174 MG/DL (ref 0–100)
POTASSIUM SERPL-SCNC: 4.2 MMOL/L (ref 3.5–5.2)
PROT SERPL-MCNC: 6.9 G/DL (ref 6–8.5)
SODIUM SERPL-SCNC: 143 MMOL/L (ref 136–145)
TRIGL SERPL-MCNC: 133 MG/DL (ref 0–150)
VLDLC SERPL CALC-MCNC: 24 MG/DL (ref 5–40)

## 2024-07-03 PROCEDURE — 99396 PREV VISIT EST AGE 40-64: CPT | Performed by: INTERNAL MEDICINE

## 2024-07-03 NOTE — PROGRESS NOTES
"Eunice Smith is a 58 y.o. female and is here for a comprehensive physical exam. The patient reports no problems.has been walking more for exercise- no complaints.     Pt is here for annual gyn exam and mammo     Trying to avoid taking medications.       Social History     Socioeconomic History    Marital status:      Spouse name: LIAM    Number of children: 1   Tobacco Use    Smoking status: Never    Smokeless tobacco: Never   Substance and Sexual Activity    Alcohol use: Yes     Comment: SC    Drug use: Never       Family History   Problem Relation Age of Onset    Heart disease Mother     Alcohol abuse Father     Colon cancer Father     Prostate cancer Father     Suicidality Brother     Alcohol abuse Brother     Suicidality Brother     Arthritis Maternal Grandmother     Arthritis Paternal Grandfather     Breast cancer Paternal Cousin     Breast cancer Other     Uterine cancer Neg Hx           Past Medical History:   Diagnosis Date    Allergic rhinitis     Hyperlipidemia     Kidney stone     Renal calculi     Tortuous colon           Current Outpatient Medications:     acyclovir (ZOVIRAX) 400 MG tablet, TAKE 1 TABLET BY MOUTH 3 (THREE) TIMES A DAY. TAKE NO MORE THAN 5 DOSES A DAY., Disp: 30 tablet, Rfl: 0    cetirizine (zyrTEC) 10 MG tablet, Take 1 tablet by mouth Daily., Disp: , Rfl:     FIBER DIET tablet, Take  by mouth., Disp: , Rfl:     vitamin D3 125 MCG (5000 UT) capsule capsule, Take 1 capsule by mouth Daily., Disp: , Rfl:     vitamin E 100 UNIT capsule, Take 1 capsule by mouth Daily., Disp: , Rfl:     Review of Systems    Review of Systems     There were no vitals filed for this visit.    Vitals:    07/03/24 0841   BP: 130/74   Pulse: 78   Weight: 103 kg (226 lb)   Height: 170.2 cm (67\")     Body mass index is 35.4 kg/m².  Wt Readings from Last 3 Encounters:   07/03/24 103 kg (226 lb)   03/07/23 103 kg (227 lb)   05/10/22 102 kg (224 lb)      Objective     Physical Exam  Exam conducted " with a chaperone present.   Constitutional:       General: She is not in acute distress.  Eyes:      Conjunctiva/sclera: Conjunctivae normal.   Neck:      Thyroid: No thyromegaly.   Cardiovascular:      Rate and Rhythm: Normal rate and regular rhythm.      Heart sounds: Normal heart sounds.   Pulmonary:      Effort: Pulmonary effort is normal.      Breath sounds: Normal breath sounds.   Chest:   Breasts:     Right: Normal.      Left: Normal.   Abdominal:      General: Bowel sounds are normal.      Palpations: Abdomen is soft.   Genitourinary:     General: Normal vulva.      Vagina: Normal.      Cervix: Normal.      Uterus: Normal.       Adnexa: Right adnexa normal and left adnexa normal.   Lymphadenopathy:      Cervical: No cervical adenopathy.      Upper Body:      Right upper body: No supraclavicular or axillary adenopathy.      Left upper body: No supraclavicular or axillary adenopathy.   Skin:     General: Skin is warm and dry.   Neurological:      Mental Status: She is alert and oriented to person, place, and time.   Psychiatric:         Behavior: Behavior normal.         Thought Content: Thought content normal.         Judgment: Judgment normal.         Procedures  Class 2 Severe Obesity (BMI >=35 and <=39.9). Obesity-related health conditions include the following: none. Obesity is unchanged. BMI is is above average; BMI management plan is completed. We discussed portion control and increasing exercise.       Assessment & Plan         Diagnoses and all orders for this visit:    1. Visit for screening mammogram (Primary)  -     Mammo Screening Digital Tomosynthesis Bilateral With CAD; Future    2. Fasting hyperglycemia  Comments:  check labs today- continue healthy exercise and diet plan  Orders:  -     Hemoglobin A1c    3. Physical exam, annual  Comments:  exam is benign- discussed weight. Rec Shingrix vaccine. Min 150 minutes of exercise week, add wt bearing exercise.  Orders:  -     Comprehensive Metabolic  Panel  -     Lipid Panel With / Chol / HDL Ratio    4. Encounter for gynecological examination  -     IGP,rfx Aptima HPV All Pth        Return in about 1 year (around 7/3/2025) for Annual physical, Lab Before FUP, Lab Today.

## 2024-07-06 LAB
CONV .: NORMAL
CYTOLOGIST CVX/VAG CYTO: NORMAL
CYTOLOGY CVX/VAG DOC CYTO: NORMAL
CYTOLOGY CVX/VAG DOC THIN PREP: NORMAL
DX ICD CODE: NORMAL
Lab: NORMAL
OTHER STN SPEC: NORMAL
STAT OF ADQ CVX/VAG CYTO-IMP: NORMAL

## 2024-07-08 DIAGNOSIS — R74.01 TRANSAMINITIS: Primary | ICD-10-CM

## 2024-07-15 ENCOUNTER — TELEPHONE (OUTPATIENT)
Dept: INTERNAL MEDICINE | Facility: CLINIC | Age: 59
End: 2024-07-15
Payer: COMMERCIAL

## 2024-10-07 ENCOUNTER — OFFICE VISIT (OUTPATIENT)
Dept: INTERNAL MEDICINE | Facility: CLINIC | Age: 59
End: 2024-10-07
Payer: COMMERCIAL

## 2024-10-07 VITALS
SYSTOLIC BLOOD PRESSURE: 134 MMHG | BODY MASS INDEX: 34.69 KG/M2 | HEIGHT: 67 IN | HEART RATE: 78 BPM | WEIGHT: 221 LBS | DIASTOLIC BLOOD PRESSURE: 74 MMHG

## 2024-10-07 DIAGNOSIS — R74.01 TRANSAMINITIS: Primary | Chronic | ICD-10-CM

## 2024-10-07 DIAGNOSIS — E78.5 BORDERLINE HYPERLIPIDEMIA: Chronic | ICD-10-CM

## 2024-10-07 PROCEDURE — 99213 OFFICE O/P EST LOW 20 MIN: CPT | Performed by: INTERNAL MEDICINE

## 2024-10-07 NOTE — PROGRESS NOTES
"Chief Complaint  Abnormal Liver functions    Subjective        Karen Smith presents to [facility]  History of Present Illness  here to discuss her liver function tests- when she turned 59 she changed some things- adding walking and yoga. Walking 15-20 minutes daily, building up. She has cut back red meat to 2-3x per week, more veggies. Wine 3-4 glasses per week. She feels good- thinks sleep issues are work related.   She has not had the ultrasound of the liver done.   Also concerned about cholesterol.  Mother with some vascular disease with diabetes.     Objective   Vital Signs:  /74   Pulse 78   Ht 170.2 cm (67\")   Wt 100 kg (221 lb)   BMI 34.61 kg/m²   Estimated body mass index is 34.61 kg/m² as calculated from the following:    Height as of this encounter: 170.2 cm (67\").    Weight as of this encounter: 100 kg (221 lb).            Physical Exam  Constitutional:       Appearance: Normal appearance.   Cardiovascular:      Rate and Rhythm: Normal rate.   Abdominal:      General: Bowel sounds are normal.      Palpations: There is no hepatomegaly.        Result Review :  The following data was reviewed by: Saray Connolly MD on 10/07/2024:  Common labs          7/3/2024    09:27   Common Labs   Glucose 111    BUN 14    Creatinine 0.88    Sodium 143    Potassium 4.2    Chloride 103    Calcium 9.7    Total Protein 6.9    Albumin 4.5    Total Bilirubin 0.6    Alkaline Phosphatase 53    AST (SGOT) 33    ALT (SGPT) 36    Total Cholesterol 255    Triglycerides 133    HDL Cholesterol 57    LDL Cholesterol  174    Hemoglobin A1C 5.50                Assessment and Plan   Diagnoses and all orders for this visit:    1. Transaminitis (Primary)  Comments:  discussed mostlikely dx is fatty liver- reduce alcohol and wt are primary tx- other labs checked today for completeness.  Orders:  -     Hepatic Function Panel  -     Hepatitis C Antibody  -     Hepatitis B Surf Antibody Quant  -     " Hepatitis B Core Ab W/Reflex  -     Hepatitis B Surface Antigen  -     Celiac Disease Panel    2. Borderline hyperlipidemia  Comments:  Likely too soon to see much change- wants to recheck for reassurance.  Orders:  -     Lipid Panel With / Chol / HDL Ratio             Follow Up   Return for Lab Today.  Patient was given instructions and counseling regarding her condition or for health maintenance advice. Please see specific information pulled into the AVS if appropriate.

## 2024-10-08 LAB
ALBUMIN SERPL-MCNC: 4.7 G/DL (ref 3.8–4.9)
ALP SERPL-CCNC: 61 IU/L (ref 44–121)
ALT SERPL-CCNC: 39 IU/L (ref 0–32)
AST SERPL-CCNC: 41 IU/L (ref 0–40)
BILIRUB DIRECT SERPL-MCNC: 0.18 MG/DL (ref 0–0.4)
BILIRUB SERPL-MCNC: 0.8 MG/DL (ref 0–1.2)
CHOLEST SERPL-MCNC: 269 MG/DL (ref 100–199)
CHOLEST/HDLC SERPL: 4.6 RATIO (ref 0–4.4)
ENDOMYSIUM IGA SER QL: NEGATIVE
HBV CORE AB SERPL QL IA: NEGATIVE
HBV SURFACE AB SER-ACNC: <3.5 MIU/ML
HBV SURFACE AG SERPL QL IA: NEGATIVE
HCV IGG SERPL QL IA: NON REACTIVE
HDLC SERPL-MCNC: 58 MG/DL
IGA SERPL-MCNC: 220 MG/DL (ref 87–352)
LDLC SERPL CALC-MCNC: 189 MG/DL (ref 0–99)
PROT SERPL-MCNC: 7.3 G/DL (ref 6–8.5)
TRIGL SERPL-MCNC: 123 MG/DL (ref 0–149)
TTG IGA SER-ACNC: <2 U/ML (ref 0–3)
VLDLC SERPL CALC-MCNC: 22 MG/DL (ref 5–40)

## 2024-10-14 ENCOUNTER — TELEPHONE (OUTPATIENT)
Dept: INTERNAL MEDICINE | Facility: CLINIC | Age: 59
End: 2024-10-14
Payer: COMMERCIAL

## 2024-10-14 NOTE — TELEPHONE ENCOUNTER
"Relay     \"Repeat blood work hasn't changed yet- I think it just a matter of being too soon. Stay the course! Let's make an appointment in 6 months for labs and f/u.  You can respond to this or call the office to schedule. \"      "

## 2024-11-22 RX ORDER — ACYCLOVIR 400 MG/1
400 TABLET ORAL 3 TIMES DAILY
Qty: 30 TABLET | Refills: 0 | Status: SHIPPED | OUTPATIENT
Start: 2024-11-22

## 2024-11-22 NOTE — TELEPHONE ENCOUNTER
Caller: Karen Smith    Relationship: Self    Best call back number: [phone]     Requested Prescriptions:   Requested Prescriptions     Pending Prescriptions Disp Refills    acyclovir (ZOVIRAX) 400 MG tablet 30 tablet 0     Sig: Take 1 tablet by mouth 3 (Three) Times a Day. Take no more than 5 doses a day.        Pharmacy where request should be sent: [id number] - [place], [place] - [address] [place] [address] [phone] [facility] [phone] FX     Last office visit with prescribing clinician: 10/7/2024   Last telemedicine visit with prescribing clinician: Visit date not found   Next office visit with prescribing clinician: 4/10/2025     Does the patient have less than a 3 day supply:  [x] Yes  [] No    Would you like a call back once the refill request has been completed: [] Yes [x] No    If the office needs to give you a call back, can they leave a voicemail: [] Yes [x] No    Terrie Rowan Rep   11/22/24 10:02 EST

## 2025-06-23 ENCOUNTER — OFFICE VISIT (OUTPATIENT)
Dept: INTERNAL MEDICINE | Facility: CLINIC | Age: 60
End: 2025-06-23
Payer: COMMERCIAL

## 2025-06-23 ENCOUNTER — TELEPHONE (OUTPATIENT)
Dept: INTERNAL MEDICINE | Facility: CLINIC | Age: 60
End: 2025-06-23

## 2025-06-23 VITALS — HEIGHT: 67 IN | WEIGHT: 227 LBS | BODY MASS INDEX: 35.63 KG/M2

## 2025-06-23 DIAGNOSIS — E78.49 OTHER HYPERLIPIDEMIA: Chronic | ICD-10-CM

## 2025-06-23 DIAGNOSIS — M10.9 ACUTE GOUT INVOLVING TOE OF RIGHT FOOT, UNSPECIFIED CAUSE: Primary | ICD-10-CM

## 2025-06-23 DIAGNOSIS — E78.5 BORDERLINE HYPERLIPIDEMIA: ICD-10-CM

## 2025-06-23 PROCEDURE — 99214 OFFICE O/P EST MOD 30 MIN: CPT | Performed by: INTERNAL MEDICINE

## 2025-06-23 RX ORDER — ATORVASTATIN CALCIUM 20 MG/1
20 TABLET, FILM COATED ORAL DAILY
Qty: 90 TABLET | Refills: 1 | Status: SHIPPED | OUTPATIENT
Start: 2025-06-23

## 2025-06-23 RX ORDER — COLCHICINE 0.6 MG/1
0.6 TABLET ORAL 2 TIMES DAILY
Qty: 60 TABLET | Refills: 0 | Status: SHIPPED | OUTPATIENT
Start: 2025-06-23

## 2025-06-23 NOTE — TELEPHONE ENCOUNTER
Delete after reviewing: Send to the appropriate pool. Check your call action grid or workflows.    Caller: Karen Smith     Relationship: SELF     Best call back number:     [phone] (Mobile)       What is your medical concern? Gout     How long has this issue been going on? SATURDAY MORNING     Is your provider already aware of this issue? NO     Have you been treated for this issue? NO     [id number] - [place], [place] - [address] [place] [address] - [phone] [facility] [phone]  [phone]

## 2025-06-23 NOTE — PROGRESS NOTES
"Chief Complaint  Gout    Subjective        Karen Smith presents to [facility]  History of Present Illness woke up 2 days ago with pain and swelling at the base of R great toe. She treated with ibuprofen 400 mg    Objective   Vital Signs:  Ht 170.2 cm (67\")   Wt 103 kg (227 lb)   BMI 35.55 kg/m²   Estimated body mass index is 35.55 kg/m² as calculated from the following:    Height as of this encounter: 170.2 cm (67\").    Weight as of this encounter: 103 kg (227 lb).            Physical Exam  Constitutional:       Appearance: Normal appearance.   Musculoskeletal:      Right foot: Tenderness: R great toe.      Left foot: Normal.        Result Review :                Assessment and Plan   Diagnoses and all orders for this visit:    1. Acute gout involving toe of right foot, unspecified cause (Primary)  Comments:  1st episode- treat with colchicine as directed- watch for triggers- check uric acid at f/u  Orders:  -     colchicine 0.6 MG tablet; Take 2 tablets by mouth at onset of gout flare, THEN take 1 tablet 2 hours later as needed for pain. Can continue 1 tablet Twice Daily for 2 weeks if pain persists.  Dispense: 60 tablet; Refill: 0  -     Uric Acid    2. Other hyperlipidemia  Comments:  we have discussed statins in the past and she is willing at this point- start atorva and recheck in 3m  Orders:  -     atorvastatin (LIPITOR) 20 MG tablet; Take 1 tablet by mouth Daily.  Dispense: 90 tablet; Refill: 1    3. Borderline hyperlipidemia  -     Comprehensive Metabolic Panel  -     Lipid Panel With / Chol / HDL Ratio             Follow Up   Return in about 3 months (around 9/23/2025) for Annual physical, Lab Before FUP.  Patient was given instructions and counseling regarding her condition or for health maintenance advice. Please see specific information pulled into the AVS if appropriate.           "

## (undated) DEVICE — ADAPT CLN BIOGUARD AIR/H2O DISP

## (undated) DEVICE — SENSR O2 OXIMAX FNGR A/ 18IN NONSTR

## (undated) DEVICE — KT ORCA ORCAPOD DISP STRL

## (undated) DEVICE — THE TORRENT IRRIGATION SCOPE CONNECTOR IS USED WITH THE TORRENT IRRIGATION TUBING TO PROVIDE IRRIGATION FLUIDS SUCH AS STERILE WATER DURING GASTROINTESTINAL ENDOSCOPIC PROCEDURES WHEN USED IN CONJUNCTION WITH AN IRRIGATION PUMP (OR ELECTROSURGICAL UNIT).: Brand: TORRENT

## (undated) DEVICE — TUBING, SUCTION, 1/4" X 10', STRAIGHT: Brand: MEDLINE

## (undated) DEVICE — CANN O2 ETCO2 FITS ALL CONN CO2 SMPL A/ 7IN DISP LF